# Patient Record
Sex: FEMALE | Race: WHITE
[De-identification: names, ages, dates, MRNs, and addresses within clinical notes are randomized per-mention and may not be internally consistent; named-entity substitution may affect disease eponyms.]

---

## 2018-02-14 ENCOUNTER — HOSPITAL ENCOUNTER (INPATIENT)
Dept: HOSPITAL 68 - ERH | Age: 28
LOS: 2 days | DRG: 885 | End: 2018-02-16
Attending: PSYCHIATRY & NEUROLOGY | Admitting: PSYCHIATRY & NEUROLOGY
Payer: COMMERCIAL

## 2018-02-14 VITALS — WEIGHT: 136.25 LBS | HEIGHT: 66 IN | BODY MASS INDEX: 21.9 KG/M2

## 2018-02-14 DIAGNOSIS — F34.81: Primary | ICD-10-CM

## 2018-02-14 DIAGNOSIS — F41.1: ICD-10-CM

## 2018-02-14 LAB
ABSOLUTE GRANULOCYTE CT: 4.6 /CUMM (ref 1.4–6.5)
BASOPHILS # BLD: 0.1 /CUMM (ref 0–0.2)
BASOPHILS NFR BLD: 1.1 % (ref 0–2)
EOSINOPHIL # BLD: 0.2 /CUMM (ref 0–0.7)
EOSINOPHIL NFR BLD: 1.9 % (ref 0–5)
ERYTHROCYTE [DISTWIDTH] IN BLOOD BY AUTOMATED COUNT: 13.3 % (ref 11.5–14.5)
GRANULOCYTES NFR BLD: 53.8 % (ref 42.2–75.2)
HCT VFR BLD CALC: 39.9 % (ref 37–47)
LYMPHOCYTES # BLD: 3.2 /CUMM (ref 1.2–3.4)
MCH RBC QN AUTO: 27.6 PG (ref 27–31)
MCHC RBC AUTO-ENTMCNC: 33.6 G/DL (ref 33–37)
MCV RBC AUTO: 82.3 FL (ref 81–99)
MONOCYTES # BLD: 0.5 /CUMM (ref 0.1–0.6)
PLATELET # BLD: 371 /CUMM (ref 130–400)
PMV BLD AUTO: 7.8 FL (ref 7.4–10.4)
RED BLOOD CELL CT: 4.85 /CUMM (ref 4.2–5.4)
WBC # BLD AUTO: 8.6 /CUMM (ref 4.8–10.8)

## 2018-02-14 PROCEDURE — G0480 DRUG TEST DEF 1-7 CLASSES: HCPCS
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
## 2018-02-15 VITALS — SYSTOLIC BLOOD PRESSURE: 122 MMHG | DIASTOLIC BLOOD PRESSURE: 79 MMHG

## 2018-02-15 VITALS — SYSTOLIC BLOOD PRESSURE: 148 MMHG | DIASTOLIC BLOOD PRESSURE: 80 MMHG

## 2018-02-15 VITALS — SYSTOLIC BLOOD PRESSURE: 109 MMHG | DIASTOLIC BLOOD PRESSURE: 72 MMHG

## 2018-02-15 NOTE — HISTORY & PHYSICAL
General Information and HPI
MD Statement:
I have seen and personally examined SHERLY HILL and documented this H&P.
 
The patient is a 27 year old F who presented with a patient stated chief 
complaint of anxiety, SI
 
Source of Information: patient
Exam Limitations: no limitations
History of Present Illness:
27-year-old female with past history significant for anxiety and depression who 
has been feeling very depressed and anxious.  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
been feeling depressed or point that she's thinking that there is no point in 
living anymore.  She does take Klonopin but says that it is not helping her at 
this time. She wants to speak with somebody and tell them about her problems, 
her trauma.  She has not attempted any suicide attempts.  She currently denies 
any aches or pains, urinary problems, nausea, vomiting, fevers or chills.
 
Allergies/Medications
Allergies:
Coded Allergies:
No Known Allergies (02/15/18)
 
Home Med list
Clonazepam 0.5 MG TABLET   1 TAB PO BID ANXIETY  (Reported)
Lorazepam 0.5 MG TABLET   1 TAB PO DAILY AS NEEDED PRN ANXIETY  (Reported)
 
 
Past History
 
Travel History
Traveled to [place] past 21 day No
 
Medical History
EENT: NONE
Respiratory: NONE
Gastrointestinal: NONE
Hepatic: NONE
Renal: NONE
Musculoskeletal: NONE
Psychiatric: DEPRESSION, ANXIETY
Endocrine: NONE
Blood Disorders: NONE
Cancer(s): NONE
GYN/Reproductive: NONE
Isolation History: Standard
Influenza Vaccine: 02/15/17
 
Surgical History
Surgical History: none
 
Past Family/Social History
 
Family History
Relations & Conditions if any
MOTHER
Relation not specified for:
  FH: CAD (coronary artery disease)
 
 
Psychosocial History
Where do you live? Other
ETOH Use: occasional use
 
Review of Systems
 
Review of Systems
Constitutional:
Reports: see HPI. 
EENTM:
Reports: see HPI. 
Cardiovascular:
Reports: see HPI. 
Respiratory:
Reports: see HPI. 
GI:
Reports: see HPI. 
Musculoskeletal:
Reports: see HPI. 
Neurological/Psychological:
Reports: see HPI. 
 
Exam & Diagnostic Data
Last 24 Hrs of Vital Signs/I&O
 Vital Signs
 
 
Date Time Temp Pulse Resp B/P B/P Pulse O2 O2 Flow FiO2
 
     Mean Ox Delivery Rate 
 
02/15 1327 98.0 91  109/72     
 
02/15 1316       Room Air  
 
02/15 1229 97.6 118 20 119/82  99 Room Air  
 
02/15 1001 97.6 103 20 111/71  99   
 
02/15 0649 96.6 66 18 106/55  99 Room Air  
 
02/15 0156 97.0 80 18 115/61  05 Room Air  
 
02/15 0005 97.5 98 18 138/83  98 Room Air  
 
02/14 1934 97.9 99 16 129/92  100 Room Air  
 
 
 Intake & Output
 
 
 02/15 1600 02/15 0800 02/15 0000
 
Intake Total   240
 
Output Total   
 
Balance   240
 
    
 
Intake, Oral   240
 
Patient 136 lb  140 lb
 
Weight   
 
Weight   Reported by Patient
 
Measurement   
 
Method   
 
 
 
 
Physical Exam
General Appearance Alert, Oriented X3, Cooperative, No Acute Distress
Skin No Rashes
HEENT PERRLA
Neck Supple
Cardiovascular Regular Rate, Normal S1, Normal S2
Lungs Clear to Auscultation
Abdomen Normal Bowel Sounds, Soft, No Tenderness
Neurological
   Cranial Nerves II through XII:
Intact
Extremities No Edema
Last 24 Hrs of Labs/Ha:
 Laboratory Tests
 
02/14/18 2024:
Urine Opiates Screen < 100.00, Methadone Screen < 40, Barbiturate Screen < 60, 
Ur Phencyclidine Scrn < 6.00, Amphetamines Screen < 100, U Benzodiazepines Scrn 
< 85, Urine Cocaine Screen < 50, Urine Cannabis Screen < 5.00
 
02/14/18 1931:
Anion Gap 11, Estimated GFR > 60, BUN/Creatinine Ratio 17.1, Glucose 81, 
Hemoglobin A1c 5.0, Calcium 10.0, Total Bilirubin 0.5, AST 16, ALT 25, Alkaline 
Phosphatase 55, Total Protein 7.4, Albumin 4.7, Globulin 2.7, Albumin/Globulin 
Ratio 1.7, Triglycerides 47, Cholesterol 145, LDL Cholesterol, Calc 65, HDL 
Cholesterol 71  H, Cholesterol/HDL Ratio 2, TSH &T3 &Free T4 Intrp 1.620, CBC w 
Diff NO MAN DIFF REQ, RBC 4.85, MCV 82.3, MCH 27.6, MCHC 33.6, RDW 13.3, MPV 7.8
, Gran % 53.8, Lymphocytes % 37.7, Monocytes % 5.5, Eosinophils % 1.9, Basophils
% 1.1, Absolute Granulocytes 4.6, Absolute Lymphocytes 3.2, Absolute Monocytes 
0.5, Absolute Eosinophils 0.2, Absolute Basophils 0.1, Serum Alcohol < 10.0
 
 
Assessment/Plan
Assessment:
27-year-old female with history significant for anxiety and depression admitted 
to Inpatient Psychiatry with worsening depression, anxiety and suicidal 
ideation.  I reviewed patient's labs and they are normal.  I would leave the 
psych management up to psychiatrist.  Currently patient does not have any acute 
medical issues.
 
As Ranked By This Provider
Problem List:
 1. Depression
 
 2. Suicidal ideations
 
 
Miscellaneous
 
Miscellaneous Documentation
Attending Case Discussed With:
Nina Perez MD
Primary Care Physician:
Unknown
 
Patient sees these Specialists
none
Level of Patient Care: [facility]
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
## 2018-02-16 VITALS — SYSTOLIC BLOOD PRESSURE: 109 MMHG | DIASTOLIC BLOOD PRESSURE: 68 MMHG

## 2018-02-16 VITALS — DIASTOLIC BLOOD PRESSURE: 67 MMHG | SYSTOLIC BLOOD PRESSURE: 101 MMHG
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
## 2018-02-16 NOTE — DISCHARGE SUMMARY REPORT-PSYCH
Visit Information
 
Visit Dates/Diagnosis'
Admission Date:
02/15/18
 
Discharge Date: 02/16/18
Reason for Admission:
Thoughts of suicide
Psy Discharge Primary Diag: disruptive mood dysregul generalized anxiety
Psy Discharge Secondary Diag: Borderline traits
 
Hospital Course
Significant Lab Findings:
Normal C B C
Normal chemistry
Clean toxicology screen
 
Course
Complications:
No complications while on the inpatient psychiatric unit
Consultations:
The patient had a history and physical examination performed by the internist/
hospitalist
Allergies:
Coded Allergies:
No Known Allergies (02/15/18)
 
Hospital Course/TX Response:
The patient had a very short stay at Inpatient Psychiatry.
On the first inpatient psychiatric stay she was pretty much focused on getting 
discharged from the hospital.  She was reluctant to accept any medications 
besides Klonopin for her anxiety. 
 The second hospital day, she was more amenable to discuss pharmacological 
interventions.  She continued to be focused on discharge.  I discussed options 
with her and she agreed to start Lexapro.
 
 
Mental status examination on the day of discharge:
The patient was alert and oriented to time place and person.  She had a steady 
gait and normal speech.  There were no abnormal movements.  She showed normal 
psychomotor activity.  There were no abnormal behaviors.  Her speech was neither
pressured nor slurred.  She showed more range of affect on the second day of 
hospitalization.  She continued to be anxious and somewhat depressed but denied 
having any thoughts of suicide.  She denied wishing death or having any thoughts
of violence or homicide.
She denied hallucinations.  There were no delusions.  Her thoughts were coherent
without a thought disorder.  She has partial insight and marginal judgment.  She
seemed to be of average intelligence.  There was no evidence of short-term 
memory impairment and she showed reasonably good attention and concentration.
 
Discharge diagnosis:
Unspecified depressive disorder most likely disruptive mood dysregulation 
disorder
Unspecified anxiety disorder
Other specified personality disorder with borderline traits.
 
 
Discharge medications Lexapro 10 mg daily and Klonopin 1 mg at bedtime
 
Discharge HBIPS
- Tobacco Use Treatment Offered
Post DC Medications Offered: Not Applicable
Post DC Tobacco Treatment Plan: Not Applicable
- EtOH/Drug Use D/O Treatment Offered
Post DC Medications Offered: NA-No EtOH/Drug Use D/O
Post DC EtOH/SubAbuse TX Plan: NA-No EtOH/Drug Use D/O
 
Metabolic Screening
- Screen if on a Neuroleptic Medication
- Metabolic screening should include:
- Blood Pressure, BMI, Glucose or Hgb A1c, & a
- Lipid profile from within the past 365 days.
Metabolic Screening
([X]) Not Applicable, patient not on a neuroleptic.
 
 OR
 
() Patient on a neuroleptic(s) .
     Enter below results for Hemoglobin A1C, 
     and lipid panel if obtained during the last 365 days.
 
BMI:      
 
Blood Pressure: 101/67
 
Laboratory Results From [facility] (If applicable):
 
 
 
Discharge Instructions
 
General Discharge Information
Multiple Neuroleptics:
(X) Not Applicable
      
     OR
   
 Document below three failed attempts at monotherapy, or a plan to taper to 
 monotherapy, or augmentation of Clozapine.
 ()
 
Discharge Diet Regular
Discharge Activity Normal
DC Disposition:
Home
Referrals
Ordered Referrals
Provider Referral 02/19/18
For Groups:
[[facility]]
 
[facility]  
[address], [place]  
[phone]  
  
Intake:  
Monday, 2/19/18, at 9:30am
 
Provider Referral 02/20/18
For Groups:
[[facility]]
 
Maria Elena Montes (therapist)  
[address]  
[phone]  
  
Patient states that she has an 
appointment with Maria Elena on Tuesday, 
2/20/18, at 2pm.
 
 
 
Prescriptions
Stop taking the following medications:
Clonazepam (Clonazepam) 0.5 MG TABLET ORAL TWICE DAILY Qty = 28
 
Lorazepam (Lorazepam) 0.5 MG TABLET ORAL DAILY AS NEEDED as needed for ANXIETY 
Qty = 14
 
Start taking the following new medications:
Clonazepam (Klonopin) 1 MG TABLET
    1 Milligram ORAL AT BEDTIME
    Qty = 15
    No Refills
    Comments:
       Last Taken:2/15/18
             Time:2300
 
Escitalopram Oxalate (Lexapro) 10 MG TABLET
    10 Milligram ORAL DAILY
    Qty = 15
    No Refills
    Comments:
       Last Taken:02/16/218
             Time:14:32
 
Trazodone HCl (Trazodone HCl) 50 MG TABLET
    50 Milligram ORAL AT BEDTIME AS NEEDED as needed for INSOMNIA
    Qty = 15
    No Refills
    Comments:
       Last Taken:2/15/18
             Time:2300
 
 
Studies Pending at Discharge
None
Copies To:
[facility]
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
## 2018-02-16 NOTE — PATIENT DISCHARGE INSTRUCTIONS
Psych Discharge Inst
 
General Discharge Information
Reason for Admission:
Thoughts of suicide
Psy Discharge Primary Diag+ disruptive mood dysregul generalized anxiety
Psy Discharge Secondary Diag+ Borderline traits
Summary Tests/Major Procedures
No abnormalities in blood or urine tests
Studies Pending at DC:
None
 
Patient Instructions
Contact Information
Your Psychiatrist on [facility] was Gharaibeh MD,Numan
 
* If you are experiencing an emergency related to this hospitalization, please 
call [phone] to contact the treating psychiatrist or the psychiatrist-on-
call.
 
* To Request a copy of your medical records, please contact the Medical Records 
Department at [phone].
 
* To request results of studies pending at the time of discharge, please call 
[phone].
 
* Continue your Medications until directed to stop by your Healthcare provider.
 
General Medication Information
Please continue to take your new medications and your continued home medications
, unless otherwise indicated on your discharge medication list, or unless 
directed by your MD or APRN to stop them.
 
 
Special Instructions
Diet Regular
Activity Normal
- Tobacco Use Treatment Offered
Post DC Medications Offered: Not Applicable
Post DC Tobacco Treatment Plan: Not Applicable
- EtOH/Drug Use D/O Treatment Offered
Post DC Medications Offered: NA-No EtOH/Drug Use D/O
Post DC EtOH/SubAbuse TX Plan: NA-No EtOH/Drug Use D/O
Metabolic Screening
([X]) Not Applicable, patient not on a neuroleptic.
 
 OR
 
() Patient on a neuroleptic(s) .
     Enter below results for Hemoglobin A1C, 
     and lipid panel if obtained during the last 365 days.
 
BMI:      
 
Blood Pressure: 101/67
 
Laboratory Results From [facility] (If applicable):
 
 
 
Advance Directives
Does the Patient have Medical Advance Directives No/Refused further info
Does Pt have Psychiatric Advance Directives? No/Refused further info
Does Patient have a Designated Surrogate Decision Maker: No
Information About Psychiatric Advance Directives Provided? Refused
 
Discharge Plan
Post Hospital Treatment Plan:
IOP

## 2018-02-19 NOTE — SOCIAL WORKER PROG NOTE PSYCH
Social Work Progress Note
Progress Note
This writer spoke with Marimar MCGOWAN by phone ([phone]) to provide discharge 
clinical.  IOP authorization obtained (and provided to July Holguin LPC at [facility]
):
# days: 12 Mental Health 
Auth: [id number] 
Start: 2/19 
End: 6/19 
Contact number: [phone]